# Patient Record
Sex: MALE | Race: WHITE | Employment: OTHER | URBAN - METROPOLITAN AREA
[De-identification: names, ages, dates, MRNs, and addresses within clinical notes are randomized per-mention and may not be internally consistent; named-entity substitution may affect disease eponyms.]

---

## 2020-02-26 ENCOUNTER — HOSPITAL ENCOUNTER (OUTPATIENT)
Dept: WOUND CARE | Age: 70
Discharge: HOME | End: 2020-02-26
Payer: MEDICARE

## 2020-02-26 VITALS
HEIGHT: 73 IN | WEIGHT: 204 LBS | TEMPERATURE: 97.4 F | HEART RATE: 71 BPM | RESPIRATION RATE: 18 BRPM | DIASTOLIC BLOOD PRESSURE: 75 MMHG | SYSTOLIC BLOOD PRESSURE: 150 MMHG | BODY MASS INDEX: 27.04 KG/M2

## 2020-02-26 PROCEDURE — 99203 OFFICE O/P NEW LOW 30 MIN: CPT | Performed by: INTERNAL MEDICINE

## 2020-02-26 PROCEDURE — 99203 OFFICE O/P NEW LOW 30 MIN: CPT

## 2020-02-26 RX ORDER — ASPIRIN 81 MG/1
81 TABLET, CHEWABLE ORAL DAILY
COMMUNITY

## 2020-02-26 RX ORDER — LEVOTHYROXINE SODIUM 0.2 MG/1
200 TABLET ORAL DAILY
COMMUNITY

## 2020-02-26 RX ORDER — ASCORBIC ACID 500 MG
1000 TABLET ORAL DAILY
COMMUNITY

## 2020-02-26 RX ORDER — GLIPIZIDE 5 MG/1
5 TABLET ORAL DAILY
COMMUNITY

## 2020-02-26 RX ORDER — AMLODIPINE BESYLATE AND BENAZEPRIL HYDROCHLORIDE 10; 20 MG/1; MG/1
1 CAPSULE ORAL DAILY
COMMUNITY

## 2020-02-26 RX ORDER — PREGABALIN 150 MG/1
150 CAPSULE ORAL 4 TIMES DAILY
COMMUNITY

## 2020-02-26 RX ORDER — LIDOCAINE 40 MG/G
CREAM TOPICAL ONCE
Status: DISCONTINUED | OUTPATIENT
Start: 2020-02-26 | End: 2020-02-27 | Stop reason: HOSPADM

## 2020-02-26 RX ORDER — OMEPRAZOLE 20 MG/1
20 CAPSULE, DELAYED RELEASE ORAL EVERY OTHER DAY
COMMUNITY

## 2020-02-26 SDOH — HEALTH STABILITY: MENTAL HEALTH: HOW OFTEN DO YOU HAVE A DRINK CONTAINING ALCOHOL?: NEVER

## 2020-02-26 NOTE — PLAN OF CARE
Pt. Referred to [facility] by his podiatrist Dr Juan Jose Roberts for left lateral ankle wound. Wound stable, no debridement today. Pt. To cont. With antibiotics as prescribed & Dr Sirisha Elkins will f/u with Dr Juan Jose Roberts re: culture results. Will discontinue vinegar soaks. Patient was previously using Gentamicin cream per podiatry. Will have patient start using Triad with the Gentamicin, change once daily. Pt. Wearing off-loading boot from podiatry. F/u in [facility] in 1 week as ordered. Discharge instructions reviewed with patient & wife, all questions answered, copy given to patient. Dressings were applied to all wounds per M.D. Instructions at this visit.

## 2020-02-27 PROBLEM — R97.20 ELEVATED PSA, LESS THAN 10 NG/ML: Status: ACTIVE | Noted: 2020-02-27

## 2020-02-27 PROBLEM — J96.01 ACUTE RESPIRATORY FAILURE WITH HYPOXIA (HCC): Status: RESOLVED | Noted: 2018-02-20 | Resolved: 2020-02-27

## 2020-02-27 PROBLEM — J96.01 ACUTE RESPIRATORY FAILURE WITH HYPOXIA (HCC): Status: ACTIVE | Noted: 2018-02-20

## 2020-02-27 PROBLEM — A41.9 SEVERE SEPSIS WITHOUT SEPTIC SHOCK (HCC): Status: ACTIVE | Noted: 2019-07-08

## 2020-02-27 PROBLEM — E11.610 CHARCOT'S ARTHROPATHY, DIABETIC (HCC): Status: ACTIVE | Noted: 2019-08-22

## 2020-02-27 PROBLEM — L97.321 DIABETIC ULCER OF LEFT ANKLE ASSOCIATED WITH TYPE 2 DIABETES MELLITUS, LIMITED TO BREAKDOWN OF SKIN (HCC): Status: ACTIVE | Noted: 2020-02-27

## 2020-02-27 PROBLEM — R78.81 GRAM-POSITIVE BACTEREMIA: Status: ACTIVE | Noted: 2018-02-22

## 2020-02-27 PROBLEM — J18.9 COMMUNITY ACQUIRED PNEUMONIA: Status: RESOLVED | Noted: 2019-07-08 | Resolved: 2020-02-27

## 2020-02-27 PROBLEM — S82.853A CLOSED TRIMALLEOLAR FRACTURE: Status: ACTIVE | Noted: 2018-01-31

## 2020-02-27 PROBLEM — E11.622 DIABETIC ULCER OF LEFT ANKLE ASSOCIATED WITH TYPE 2 DIABETES MELLITUS, LIMITED TO BREAKDOWN OF SKIN (HCC): Status: ACTIVE | Noted: 2020-02-27

## 2020-02-27 PROBLEM — I83.892: Status: ACTIVE | Noted: 2018-06-07

## 2020-02-27 PROBLEM — J18.9 COMMUNITY ACQUIRED PNEUMONIA: Status: ACTIVE | Noted: 2019-07-08

## 2020-02-27 PROBLEM — M19.172 POST-TRAUMATIC OSTEOARTHRITIS OF LEFT ANKLE: Status: ACTIVE | Noted: 2019-10-03

## 2020-02-27 PROBLEM — R65.20 SEVERE SEPSIS WITHOUT SEPTIC SHOCK (HCC): Status: ACTIVE | Noted: 2019-07-08

## 2020-02-27 PROBLEM — A41.9 SEVERE SEPSIS WITHOUT SEPTIC SHOCK (HCC): Status: RESOLVED | Noted: 2019-07-08 | Resolved: 2020-02-27

## 2020-02-27 PROBLEM — N17.9 AKI (ACUTE KIDNEY INJURY) (HCC): Status: RESOLVED | Noted: 2018-02-20 | Resolved: 2020-02-27

## 2020-02-27 PROBLEM — M25.572 CHRONIC PAIN OF LEFT ANKLE: Status: ACTIVE | Noted: 2018-02-01

## 2020-02-27 PROBLEM — M81.0 AGE-RELATED OSTEOPOROSIS WITHOUT CURRENT PATHOLOGICAL FRACTURE: Status: ACTIVE | Noted: 2018-08-27

## 2020-02-27 PROBLEM — N17.9 AKI (ACUTE KIDNEY INJURY) (HCC): Status: ACTIVE | Noted: 2018-02-20

## 2020-02-27 PROBLEM — Z86.010 HISTORY OF COLONIC POLYPS: Status: ACTIVE | Noted: 2020-02-27

## 2020-02-27 PROBLEM — I10 ESSENTIAL HYPERTENSION: Status: ACTIVE | Noted: 2020-02-27

## 2020-02-27 PROBLEM — R65.20 SEVERE SEPSIS WITHOUT SEPTIC SHOCK (HCC): Status: RESOLVED | Noted: 2019-07-08 | Resolved: 2020-02-27

## 2020-02-27 PROBLEM — J10.1 INFLUENZA A: Status: ACTIVE | Noted: 2020-02-27

## 2020-02-27 PROBLEM — H90.3 ASYMMETRICAL SENSORINEURAL HEARING LOSS: Status: ACTIVE | Noted: 2017-05-17

## 2020-02-27 PROBLEM — J10.1 INFLUENZA A: Status: RESOLVED | Noted: 2020-02-27 | Resolved: 2020-02-27

## 2020-02-27 PROBLEM — R78.81 GRAM-POSITIVE BACTEREMIA: Status: RESOLVED | Noted: 2018-02-22 | Resolved: 2020-02-27

## 2020-02-27 PROBLEM — S82.853A CLOSED TRIMALLEOLAR FRACTURE: Status: RESOLVED | Noted: 2018-01-31 | Resolved: 2020-02-27

## 2020-02-27 PROBLEM — G89.29 CHRONIC PAIN OF LEFT ANKLE: Status: ACTIVE | Noted: 2018-02-01

## 2020-02-27 PROBLEM — Z86.010 HISTORY OF COLONIC POLYPS: Status: RESOLVED | Noted: 2020-02-27 | Resolved: 2020-02-27

## 2020-02-27 PROBLEM — L03.116 CELLULITIS OF LEFT ANKLE: Status: ACTIVE | Noted: 2020-02-27

## 2020-02-27 RX ORDER — SULFAMETHOXAZOLE AND TRIMETHOPRIM 800; 160 MG/1; MG/1
1 TABLET ORAL 2 TIMES DAILY
COMMUNITY
Start: 2020-02-24 | End: 2020-03-07 | Stop reason: ALTCHOICE

## 2020-02-27 NOTE — CONSULTS
[facility] (and ID) Consult Note    Chago Ocasio     : 1950    DATE OF VISIT:  2020    Subjective: Chago Ocasio is a 65 y.o. male who has a diabetic and  neuropathic ulcer located on the left, lateral ankle. Dr. Kelvin Fairbanks requested a consultation regarding this new diabetic ulcer, complicated by infection, and at fairly high risk of complications. Current complaint of pain in this ulcer? yes. Quality of pain: aching and burning  Timing: intermittent and stable  Severity: mild  Associated Signs/Symptoms: swelling, redness, drainage (light, cloudy) and numbness  Other significant symptoms or pertinent ulcer history: Mr. Catalina Ruelas has a long history of DM, neuropathy, a trimalleolar ankle fracture, along with Charcot changes, osteoarthritis and osteoporosis affecting the left foot and ankle. He has been in and out of a few different boots and braces over the years, and recently transitioned into a new left ankle brace. Unfortunately it sounds like a new (presumably friction) blister developed at the lateral ankle, and then that was followed by some redness and increased swelling. He saw Dr. Ankit Amaral earlier this week, who got some repeat Xrays, debrided and drained the abscess, prescribed some empiric oral Bactrim, and a local-care regimen of vinegar soaks, gentamicin cream BID, and a new (temporary) AFO boot cast.    No F/C/D, tolerating Bactrim ok (had some brief GI upset, now gone), no sore throat or mouth, no diarrhea, no new rash. His LLE swelling might be increased a bit, as he normally wears a compression stocking, but can't do that right now because of the need to have the dressing on his ankle. Compared to Monday, his wife things the ankle look a bit less red, stably swollen, but the ulcer looks a bit larger to her, if perhaps healthier and less inflamed.       Additional ulcer(s) noted? no.      Mr. Dorothy Litten has a past medical history of Acute respiratory failure with hypoxia ([facility] [address] JIMMY (acute kidney injury) ([address] Atopic dermatitis, Closed fracture of phalanx of foot, Closed fracture of right fibula, Closed trimalleolar fracture of left ankle, Colon polyps, Community acquired pneumonia, Cutaneous abscess of right foot, Influenza A, Lesion of left plantar nerve, Neuralgia and neuritis, Rectal bleeding, and Severe sepsis without septic shock ([address] He has a past surgical history that includes hernia repair; Cholecystectomy; shoulder surgery (Right, 2013); shoulder surgery (Left, 2016); Toe amputation (Left, 2007); Carpal tunnel release (Bilateral, 2013); Foot surgery (Right, 1991); Vasectomy; Elbow surgery (Right, 2004); Tonsillectomy; Colonoscopy; Thyroidectomy, Completion (2016); and Wrist fracture surgery (Left, 1998). His family history includes COPD in his father and mother; Diabetes in his mother. Mr. Vitor Rodriguez reports that he has never smoked. He has never used smokeless tobacco. He reports that he does not drink alcohol or use drugs. His current medication list consists of Calcium Citrate, Multiple Vitamin, NONFORMULARY, Vitamin D, amLODIPine-benazepril, aspirin, glipiZIDE, levothyroxine, metFORMIN, omeprazole, pregabalin, and vitamin C. Allergies: Adhesive tape    Pertinent items from the review of systems are discussed in the HPI; the remainder of the ROS was reviewed and is negative.      Objective:     Vitals:    02/26/20 0947   BP: (!) 150/75   Pulse: 71   Resp: 18   Temp: 97.4 °F (36.3 °C)   TempSrc: Oral   Weight: 204 lb (92.5 kg)   Height: 6' 1\" (1.854 m)       Constitutional:  well-developed, well-nourished, NAD  Psychiatric:  oriented to person, place and time; mood and affect appropriate for the situation   Eyes:  pupils equal, round and reactive to light; sclerae anicteric, conjunctivae not pale  ENT: no thrush or oral ulcers, mucous membranes moist  Abd: soft, NT, ND, good BS  Cardiovascular:  bilateral pedal pulses palpable, foot warm, good cap refill; mild right and moderate left lower extremity pitting edema  Lymphatic:  no inguinal or popliteal adenopathy, no angitis, presumably fading and receding cellulitis of the left ankle, no fluctuance  Musculoskeletal:  no clubbing, cyanosis or petechiae; RLE and LLE with no gross effusions or acute arthritis, presumably stable Charcot and post-op deformity of the left foot and ankle  Rita-ulcer skin: indurated, pink, erythematous , warm and one edge a bit macerated. Ulcer(s): central area that his wife says represents the size of the original blister is superficial, pale, pink, fibrin, serous exudate, but there's a small extension of the ulcer in the plantar direction, compared to Monday, that actually looks quite clean and red. Photos also saved in electronic chart. Today's Wound Measurements, per RN documentation:  Wound 02/26/20 #1, Left Ankle, Diabetic foot ulcer, Turcios 1, Onset 2/20/20-Wound Length (cm): 1.2 cm    Wound 02/26/20 #1, Left Ankle, Diabetic foot ulcer, Turcios 1, Onset 2/20/20-Wound Width (cm): 2.5 cm    Wound 02/26/20 #1, Left Ankle, Diabetic foot ulcer, Turcios 1, Onset 2/20/20-Wound Depth (cm): 0.1 cm  ______________________________    [facility] labs from January: Creat 0.95, albumin 4.4, Hgb 15.4, Hgb A1c 6.3%. Monday outpatient WCx pending. Monday XR without any signs of osteomyelitis. LLE arterial Duplex from May 2019 -- Ankle/brachial index in the left lower extremity is within normal limits, suggesting no evidence of arterial insufficiency at rest. Normal multiphasic doppler spectra at the left common femoral level suggest no evidence of significant inflow occlusive disease in the left lower extremity. There is no evidence of significant femoropopliteal occlusive disease or plaque formation in the left lower extremity. No significant tibial occlusive disease is noted and there is normal, three-vessel runoff in the left lower extremity.     Assessment: Patient Active Problem List   Diagnosis Code    Age-related osteoporosis without current pathological fracture M81.0    Asymmetrical sensorineural hearing loss H90.5    Charcot's arthropathy, diabetic (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 116    Elevated PSA, less than 10 ng/ml R97.20    Acquired cavus foot deformity M21.6X9    Claw toe, acquired M20.5X9       Assessment of today's active condition(s): well controlled DM2, neuropathy, Charcot deformity, chronic LLE swelling, no signs of ischemic disease, new superficial diabetic neuropathic ulcer seemingly related to friction from a new brace, complicated by soft tissue infection, but it sounds like things are starting to improve a bit already, from Monday's debridement & drainage, and antibiotics, apart from perhaps just a bit of excess moisture in the area. No strong suspicion of residual deep infection at this point. Factors contributing to occurrence and/or persistence of the chronic ulcer include edema, diabetes and shear force. Medical necessity of today's visit is shown by the above documentation. Sharp debridement is not indicated today, based upon the exam findings in the wound(s) above.      Discharge plan:     Treatment in the wound care center today, per RN documentation: Wound 02/26/20 #1, Left Ankle, Diabetic foot ulcer, Turcios 1, Onset 2/20/20-Dressing/Treatment: Triad hydro(gentamycin cream, mepilex border, spandigrip - E). Continue Bactrim for now. Await final Cx. I'll ask Dr. An Conde to fax that to us when available. Call if fever, rash, sore mouth, vomiting, diarrhea, etc.     Can stop vinegar soaks at this point. Will add a bit of Triad dressing to support autolytic debridement of that fibrinous necrosis; might need a bit of sharp debridement next week again. I reminded the patient of the importance of weight management and smoking cessation, if applicable; also encouraged ambulation as tolerated, additional lower extremity exercises as instructed in our education sheet, leg elevation when at rest, and compliance with any recommended dietary, diuretic and compression therapies. While needing the ulcer dressing, we just gave him a couple of lengths of Spandagrip compression for his edema, in lieu of his stockings. Given his complicated foot and ankle history, I'm going to leave offloading and ambulation decisions to [facility] Conde and [employer]. Home treatment: good handwashing before and after any dressing changes. Cleanse ulcer with saline or soap & water before dressing change. May use Vaseline (petrolatum), Aquaphor, Aveeno, CeraVe, Cetaphil, Eucerin, Lubriderm, etc for dry skin. Dressing type for home: if the wound is fairly dry or neutral, gentamicin + Triad + dry dressing, but if remains a bit too moist, Triad + silver alginate + dry dressing, once daily. Written discharge instructions given to patient. Follow up in 1 week. Electronically signed by Gely Wing MD on 2/27/2020 at 11:39 AM.  ________________________    ADDENDUM --     Culture result faxed from Dr. Neftali Keys office -- no WBCs, no organisms, no growth in 48 hours (aerobic). No changes needed from my standpoint.     Electronically signed by Gely Wing MD on 2/28/2020 at 8:03 AM

## 2020-03-04 ENCOUNTER — HOSPITAL ENCOUNTER (OUTPATIENT)
Dept: WOUND CARE | Age: 70
Discharge: HOME | End: 2020-03-04
Payer: MEDICARE

## 2020-03-04 VITALS
DIASTOLIC BLOOD PRESSURE: 70 MMHG | HEART RATE: 66 BPM | BODY MASS INDEX: 27.3 KG/M2 | SYSTOLIC BLOOD PRESSURE: 146 MMHG | WEIGHT: 206 LBS | HEIGHT: 73 IN | TEMPERATURE: 97.1 F | RESPIRATION RATE: 18 BRPM

## 2020-03-04 PROCEDURE — 97597 DBRDMT OPN WND 1ST 20 CM/<: CPT | Performed by: INTERNAL MEDICINE

## 2020-03-04 PROCEDURE — 97597 DBRDMT OPN WND 1ST 20 CM/<: CPT

## 2020-03-04 RX ORDER — LIDOCAINE HYDROCHLORIDE 40 MG/ML
SOLUTION TOPICAL ONCE
Status: DISCONTINUED | OUTPATIENT
Start: 2020-03-04 | End: 2020-03-05 | Stop reason: HOSPADM

## 2020-03-04 NOTE — PLAN OF CARE
Wound stable, debridement per MD & pt. Tolerated well. Will cont. With current wound care regime. Pt. To cont. To wear the off-loading boot he got from Dr Too Viveros as ordered. [facility] as ordered. Discharge instructions reviewed with patient & wife, all questions answered, copy given to patient. Dressings were applied to all wounds per M.D. Instructions at this visit.

## 2020-03-07 PROBLEM — L03.116 CELLULITIS OF LEFT ANKLE: Status: RESOLVED | Noted: 2020-02-27 | Resolved: 2020-03-07

## 2020-03-07 NOTE — PROGRESS NOTES
[facility] Progress Note    Oxana Morrell     : 1950    DATE OF VISIT:  3/4/2020    Subjective: Oxana Morrell is a 65 y.o. male who has a diabetic ulcer located on the left, lateral ankle. Significant symptoms or pertinent wound history since last visit: feeling ok overall, mild discomfort, still some swelling, less drainage, no redness, no F/C/D. Completed Bactrim, no sore throat or mouth, no rash, no N/V/D. Additional ulcer(s) noted? no.      His current medication list consists of Calcium Citrate, Multiple Vitamin, NONFORMULARY, Vitamin D, amLODIPine-benazepril, aspirin, glipiZIDE, levothyroxine, metFORMIN, omeprazole, pregabalin, and vitamin C. Allergies: Adhesive tape    Objective:     Vitals:    20 0942   BP: (!) 146/70   Pulse: 66   Resp: 18   Temp: 97.1 °F (36.2 °C)   TempSrc: Oral   Weight: 206 lb (93.4 kg)   Height: 6' 1\" (1.854 m)     AAOx3, NAD  Intact distal pulses, foot warm, good cap refill  Mild-mod LLE edema  No cellulitis, angitis, fluctuance  No icterus, thrush, drug rash, abd tenderness  Rita-ulcer skin: healthy, warm and pink - less erythema, less maceration  Ulcer(s): partial thickness, a bit smaller, starting to look more pink and less fibrotic, some fibrinous debris and serous exudate. Photos also saved in electronic chart.     Today's wound measurements, per RN documentation:  Wound 20 #1, Left Ankle, Diabetic foot ulcer, Turcios 1, Onset 20-Wound Length (cm): 0.9 cm    Wound 20 #1, Left Ankle, Diabetic foot ulcer, Turcios 1, Onset 20-Wound Width (cm): 2.9 cm    Wound 20 #1, Left Ankle, Diabetic foot ulcer, Turcios 1, Onset 20-Wound Depth (cm): 0.1 cm    Assessment:     Patient Active Problem List   Diagnosis Code    Age-related osteoporosis without current pathological fracture M81.0    Asymmetrical sensorineural hearing loss H90.5    Charcot's arthropathy, diabetic ([address] E11.610    Chronic pain of left ankle M25.572, G89.29    Erectile dysfunction N52.9    Essential hypertension I10    GERD (gastroesophageal reflux disease) K21.9    Irritable bowel syndrome with constipation and diarrhea K58.2    Type 2 diabetes mellitus with diabetic polyneuropathy, without long-term current use of insulin ([facility]) E11.42    Postoperative hypothyroidism E89.0    Post-traumatic osteoarthritis of left ankle M19.172    Pure hypercholesterolemia E78.00    Varicose veins of lower extremity with edema, left I83.892    Diabetic ulcer of left ankle associated with type 2 diabetes mellitus, limited to breakdown of skin ([facility]) E11.622, L97.321    Elevated PSA, less than 10 ng/ml R97.20    Acquired cavus foot deformity M21.6X9    Claw toe, acquired M20.5X9       Assessment of today's active condition(s): DM2, Hx Charcot reconstruction, chronic venous edema, recent change in offloading boot, development of blister, diabetic ulcer, infection (now resolved), no signs of ischemia. Factors contributing to occurrence and/or persistence of the chronic ulcer include edema, diabetes and shear force. Medical necessity of today's visit is shown by the above documentation. Sharp debridement is indicated today, based upon the exam findings in the wound(s) above. Procedure note:     Consent obtained. Time out performed per [facility] policy. Anesthetic  Anesthetic: 4% Lidocaine Cream     Using a curette, I sharply debrided the left ankle ulcer(s) down through and including the removal of dermis. The type(s) of tissue debrided included fibrin and exudate. Total Surface Area Debrided: 2 sq cm. The ulcers were then irrigated with normal saline solution. The procedure was completed with a small amount of bleeding, and hemostasis was with pressure. The patient tolerated the procedure well, with no significant complications. The patient's level of pain during and after the procedure was monitored.  Post-debridement measurements, if different from pre-debridement, are in the flowsheet as well. Discharge plan:     Treatment in the wound care center today, per RN documentation: Wound 02/26/20 #1, Left Ankle, Diabetic foot ulcer, Turcios 1, Onset 2/20/20-Dressing/Treatment: (triad gentamycin dry dressing spandagrip). I reminded the patient of the importance of weight management and smoking cessation, if applicable; also encouraged ambulation as tolerated, additional lower extremity exercises as instructed in our education sheet, leg elevation when at rest, and compliance with any recommended dietary, diuretic and compression therapies. Continue good work with glucoses; no additional Abx needed. AFO boot-cast per Dr. Daphney Wyatt. Home treatment: good handwashing before and after any dressing changes. Cleanse wound with saline or soap & water before dressing change. May use Vaseline (petrolatum), Aquaphor, Aveeno, CeraVe, Cetaphil, Eucerin, Lubriderm, etc for dry skin. Dressing type for home: as above, once daily. Written discharge instructions given to patient. Follow up in 1 week.     Electronically signed by Penny Downey MD on 3/7/2020 at 9:02 AM.

## 2020-03-11 ENCOUNTER — HOSPITAL ENCOUNTER (OUTPATIENT)
Dept: WOUND CARE | Age: 70
Discharge: HOME | End: 2020-03-11
Payer: MEDICARE

## 2020-03-11 VITALS
HEART RATE: 74 BPM | SYSTOLIC BLOOD PRESSURE: 157 MMHG | HEIGHT: 73 IN | RESPIRATION RATE: 20 BRPM | TEMPERATURE: 97.1 F | DIASTOLIC BLOOD PRESSURE: 70 MMHG | WEIGHT: 200 LBS | BODY MASS INDEX: 26.51 KG/M2

## 2020-03-11 PROCEDURE — 97597 DBRDMT OPN WND 1ST 20 CM/<: CPT

## 2020-03-11 PROCEDURE — 97597 DBRDMT OPN WND 1ST 20 CM/<: CPT | Performed by: INTERNAL MEDICINE

## 2020-03-11 RX ORDER — LIDOCAINE 40 MG/G
CREAM TOPICAL ONCE
Status: DISCONTINUED | OUTPATIENT
Start: 2020-03-11 | End: 2020-03-12 | Stop reason: HOSPADM

## 2020-03-15 NOTE — PROGRESS NOTES
[facility] Progress Note    Nirav Muir     : 1950    DATE OF VISIT:  3/11/2020    Subjective: Nirav Muir is a 65 y.o. male who has a diabetic ulcer located on the left, lateral ankle. Significant symptoms or pertinent wound history since last visit: feeling well, modest drainage, little discomfort, no pruritus, still some swelling, no F/C/D. A bit concerned about a rash around his medial foot, and then a small plantar midfoot ecchymosis was first noticed today - he doesn't recall stepping on anything, states that he is wearing his cast-boot almost all the time, including indoors. Additional ulcer(s) noted? no.      His current medication list consists of Calcium Citrate, Multiple Vitamin, NONFORMULARY, Vitamin D, amLODIPine-benazepril, aspirin, glipiZIDE, levothyroxine, metFORMIN, omeprazole, pregabalin, and vitamin C. Allergies: Adhesive tape    Objective:     Vitals:    20 0958   BP: (!) 157/70   Pulse: 74   Resp: 20   Temp: 97.1 °F (36.2 °C)   TempSrc: Oral   Weight: 200 lb (90.7 kg)   Height: 6' 1\" (1.854 m)     AAOx3, NAD  Intact distal pulses, foot warm, good cap refill  Mild-mod LLE edema  No cellulitis, angitis, fluctuance  Mild petechial rash on medial aspect of foot, I think from a combination of venous HTN, ASA therapy, and recent increase in edema / decrease in compression because he's focusing on dressing changes for the ankle ulcer over compression stocking use  Plantar midfoot irregular ecchymosis, perhaps 2-3 cm across, no signs of infection or deeper trauma  Rita-ulcer skin: pink, warm and hyperkeratotic. Ulcer(s): smaller, pink-red, finely granular, fibrin, serous exudate. Photos also saved in electronic chart.     Today's wound measurements, per RN documentation:  Wound 20 #1, Left Ankle, Diabetic foot ulcer, Turcios 1, Onset 20-Wound Length (cm): 0.8 cm    Wound 20 #1, Left Ankle, Diabetic foot ulcer, Turcios 1, Onset 2/20/20-Wound Width (cm): 1 cm    Wound 02/26/20 #1, Left Ankle, Diabetic foot ulcer, Turcios 1, Onset 2/20/20-Wound Depth (cm): 0.1 cm    Assessment:     Patient Active Problem List   Diagnosis Code    Age-related osteoporosis without current pathological fracture M81.0    Asymmetrical sensorineural hearing loss H90.5    Charcot's arthropathy, diabetic ([facility] E11.610    Chronic pain of left ankle M25.572, G89.29    Erectile dysfunction N52.9    Essential hypertension I10    GERD (gastroesophageal reflux disease) K21.9    Irritable bowel syndrome with constipation and diarrhea K58.2    Type 2 diabetes mellitus with diabetic polyneuropathy, without long-term current use of insulin ([facility]) E11.42    Postoperative hypothyroidism E89.0    Post-traumatic osteoarthritis of left ankle M19.172    Pure hypercholesterolemia E78.00    Varicose veins of lower extremity with edema, left I83.892    Diabetic ulcer of left ankle associated with type 2 diabetes mellitus, limited to breakdown of skin ([facility]) E11.622, L97.321    Elevated PSA, less than 10 ng/ml R97.20    Acquired cavus foot deformity M21.6X9    Claw toe, acquired M20.5X9       Assessment of today's active condition(s): DM2, neuropathy, Hx Charcot foot, new blister and ulcer at left lateral ankle after getting into a new brace; recent infection resolved, no signs of ischemia, ulcer healing fairly well. Factors contributing to occurrence and/or persistence of the chronic ulcer include edema, diabetes and shear force. Medical necessity of today's visit is shown by the above documentation. Sharp debridement is indicated today, based upon the exam findings in the wound(s) above. Procedure note:     Consent obtained. Time out performed per [facility] policy. Anesthetic  Anesthetic: 4% Lidocaine Cream     Using a #15 blade scalpel, I sharply debrided the left ankle ulcer(s) down through and including the removal of dermis.  The type(s) of tissue debrided included fibrin and exudate. Total Surface Area Debrided: 1 sq cm. The ulcers were then irrigated with normal saline solution. The procedure was completed with a small amount of bleeding, and hemostasis was with pressure. The patient tolerated the procedure well, with no significant complications. The patient's level of pain during and after the procedure was monitored. Post-debridement measurements, if different from pre-debridement, are in the flowsheet as well. Discharge plan:     Treatment in the wound care center today, per RN documentation: Wound 02/26/20 #1, Left Ankle, Diabetic foot ulcer, Turcios 1, Onset 2/20/20-Dressing/Treatment: (gentamycin purachol ag dry dressing spandagrip). Continue cast-boot for offloading for now. Be sure not to walk without shoe or boot, even indoors, given neuropathy and risk of injury. I reminded the patient of the importance of weight management and smoking cessation, if applicable; also encouraged ambulation as tolerated, additional lower extremity exercises as instructed in our education sheet, leg elevation when at rest, and compliance with any recommended dietary, diuretic and compression therapies. Tubular compression bandage until he can resume his stocking, after the ankle ulcer is healed. Home treatment: good handwashing before and after any dressing changes. Cleanse wound with saline or soap & water before dressing change. May use Vaseline (petrolatum), Aquaphor, Aveeno, CeraVe, Cetaphil, Eucerin, Lubriderm, etc for dry skin. Dressing type for home: as above, every day or two, as needed. Written discharge instructions given to patient. Follow up in 1 week.     Electronically signed by Carolynne Severin, MD on 3/15/2020 at 6:54 PM.

## 2020-03-18 ENCOUNTER — HOSPITAL ENCOUNTER (OUTPATIENT)
Dept: WOUND CARE | Age: 70
Discharge: HOME | End: 2020-03-18
Payer: MEDICARE

## 2020-03-18 VITALS
TEMPERATURE: 97.3 F | HEART RATE: 66 BPM | DIASTOLIC BLOOD PRESSURE: 73 MMHG | SYSTOLIC BLOOD PRESSURE: 143 MMHG | WEIGHT: 203.38 LBS | RESPIRATION RATE: 18 BRPM | BODY MASS INDEX: 26.95 KG/M2 | HEIGHT: 73 IN

## 2020-03-18 PROCEDURE — 97597 DBRDMT OPN WND 1ST 20 CM/<: CPT | Performed by: INTERNAL MEDICINE

## 2020-03-18 PROCEDURE — 97597 DBRDMT OPN WND 1ST 20 CM/<: CPT

## 2020-03-18 RX ORDER — LIDOCAINE HYDROCHLORIDE 40 MG/ML
SOLUTION TOPICAL ONCE
Status: DISCONTINUED | OUTPATIENT
Start: 2020-03-18 | End: 2020-03-19 | Stop reason: HOSPADM

## 2020-03-18 ASSESSMENT — PAIN SCALES - GENERAL: PAINLEVEL_OUTOF10: 0

## 2020-03-18 NOTE — PLAN OF CARE
Wound improving well, debridement per MD & pt. Tolerated well. Will cont. With current wound care regime. F/u in [facility] in 2 weeks as ordered. Discharge instructions reviewed with patient, all questions answered, copy given to patient. Dressings were applied to all wounds per M.D. Instructions at this visit.

## 2020-03-18 NOTE — PROGRESS NOTES
Ankle, Diabetic foot ulcer, Turcios 1, Onset 2/20/20-Wound Width (cm): 0.4 cm    Wound 02/26/20 #1, Left Ankle, Diabetic foot ulcer, Turcios 1, Onset 2/20/20-Wound Depth (cm): 0.1 cm    Assessment:     Patient Active Problem List   Diagnosis Code    Age-related osteoporosis without current pathological fracture M81.0    Asymmetrical sensorineural hearing loss H90.5    Charcot's arthropathy, diabetic ([facility] [address] E11.610    Chronic pain of left ankle M25.572, G89.29    Erectile dysfunction N52.9    Essential hypertension I10    GERD (gastroesophageal reflux disease) K21.9    Irritable bowel syndrome with constipation and diarrhea K58.2    Type 2 diabetes mellitus with diabetic polyneuropathy, without long-term current use of insulin ([facility]) E11.42    Postoperative hypothyroidism E89.0    Post-traumatic osteoarthritis of left ankle M19.172    Pure hypercholesterolemia E78.00    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 Factors contributing to occurrence and/or persistence of the chronic ulcer include edema, diabetes and shear force. Medical necessity of today's visit is shown by the above documentation. Sharp debridement is indicated today, based upon the exam findings in the wound(s) above. Procedure note:     Consent obtained. Time out performed per [facility] policy.     Anesthetic  Anesthetic: 4% Lidocaine Cream     Using a curette, I sharply debrided the left ankle ulcer(s) down through and including the removal of dermis. The type(s) of tissue debrided included fibrin and exudate. Total Surface Area Debrided: 1 sq cm. The ulcers were then irrigated with normal saline solution. The procedure was completed with a small amount of bleeding, and hemostasis was with pressure. The patient tolerated the procedure well, with no significant complications. The patient's level of pain during and after the procedure was monitored. Post-debridement measurements, if different from pre-debridement, are in the flowsheet as well. Discharge plan:     Treatment in the wound care center today, per RN documentation: Wound 02/26/20 #1, Left Ankle, Diabetic foot ulcer, Turcios 1, Onset 2/20/20-Dressing/Treatment: (Gentamicin,Puracol Ag,gauze,Fredy,sgl Low Spandagrip). We discussed the risks and benefits of ongoing weekly follow-up versus taking a week off, given the coronavirus outbreak, and decided that a 2-week follow-up made the most sense. If he is clearly healed by then, he may call to cancel, and just resume long-term care with podiatry and orthopedics. If he has concerns before that 2-week paul, he can certainly call, and I'll see him back if needed. I reminded the patient of the importance of weight management and smoking cessation, if applicable; also encouraged ambulation as tolerated, additional lower extremity exercises as instructed in our education sheet, leg elevation when at rest, and compliance with any recommended dietary, diuretic and compression therapies. Continue current AFO cast boot until healed. Home treatment: good handwashing before and after any dressing changes. Cleanse wound with saline or soap & water before dressing change. May use Vaseline (petrolatum), Aquaphor, Aveeno, CeraVe, Cetaphil, Eucerin, Lubriderm, etc for dry skin. Dressing type for home: as above, once daily. Written discharge instructions given to patient. Follow up in 2 weeks.     Electronically signed by Rey Bernheim, MD on 3/18/2020 at [address] PM.

## 2020-04-01 ENCOUNTER — HOSPITAL ENCOUNTER (OUTPATIENT)
Dept: WOUND CARE | Age: 70
Discharge: HOME | End: 2020-04-01
Payer: MEDICARE

## 2020-04-08 ENCOUNTER — TELEPHONE (OUTPATIENT)
Dept: WOUND CARE | Age: 70
End: 2020-04-08

## 2020-04-13 ENCOUNTER — TELEPHONE (OUTPATIENT)
Dept: WOUND CARE | Age: 70
End: 2020-04-13

## 2021-07-20 ENCOUNTER — ANESTHESIA EVENT (OUTPATIENT)
Dept: OPERATING ROOM | Age: 71
End: 2021-07-20
Payer: MEDICARE

## 2021-07-20 RX ORDER — TAMSULOSIN HYDROCHLORIDE 0.4 MG/1
0.4 CAPSULE ORAL 2 TIMES DAILY
COMMUNITY

## 2021-07-20 NOTE — PROGRESS NOTES

## 2021-07-22 ENCOUNTER — HOSPITAL ENCOUNTER (OUTPATIENT)
Age: 71
Setting detail: OUTPATIENT SURGERY
Discharge: HOME | End: 2021-07-22
Attending: ORTHOPAEDIC SURGERY | Admitting: ORTHOPAEDIC SURGERY
Payer: MEDICARE

## 2021-07-22 ENCOUNTER — ANESTHESIA (OUTPATIENT)
Dept: OPERATING ROOM | Age: 71
End: 2021-07-22
Payer: MEDICARE

## 2021-07-22 ENCOUNTER — APPOINTMENT (OUTPATIENT)
Dept: GENERAL RADIOLOGY | Age: 71
End: 2021-07-22
Attending: ORTHOPAEDIC SURGERY
Payer: MEDICARE

## 2021-07-22 VITALS — SYSTOLIC BLOOD PRESSURE: 160 MMHG | TEMPERATURE: 75.4 F | DIASTOLIC BLOOD PRESSURE: 98 MMHG | OXYGEN SATURATION: 94 %

## 2021-07-22 VITALS
OXYGEN SATURATION: 93 % | BODY MASS INDEX: 26.24 KG/M2 | HEIGHT: 73 IN | RESPIRATION RATE: 12 BRPM | DIASTOLIC BLOOD PRESSURE: 73 MMHG | HEART RATE: 77 BPM | SYSTOLIC BLOOD PRESSURE: 135 MMHG | TEMPERATURE: 97.4 F | WEIGHT: 198 LBS

## 2021-07-22 DIAGNOSIS — S52.022A CLOSED FRACTURE OF OLECRANON PROCESS OF LEFT ULNA, INITIAL ENCOUNTER: Primary | ICD-10-CM

## 2021-07-22 LAB
ANION GAP SERPL CALCULATED.3IONS-SCNC: 12 MMOL/L (ref 3–16)
BUN BLDV-MCNC: 13 MG/DL (ref 7–20)
CALCIUM SERPL-MCNC: 8.8 MG/DL (ref 8.3–10.6)
CHLORIDE BLD-SCNC: 100 MMOL/L (ref 99–110)
CO2: 25 MMOL/L (ref 21–32)
CREAT SERPL-MCNC: 0.9 MG/DL (ref 0.8–1.3)
GFR AFRICAN AMERICAN: >60
GFR NON-AFRICAN AMERICAN: >60
GLUCOSE BLD-MCNC: 121 MG/DL (ref 70–99)
GLUCOSE BLD-MCNC: 139 MG/DL (ref 70–99)
HCT VFR BLD CALC: 41 % (ref 40.5–52.5)
HEMOGLOBIN: 13.8 G/DL (ref 13.5–17.5)
MCH RBC QN AUTO: 29.4 PG (ref 26–34)
MCHC RBC AUTO-ENTMCNC: 33.7 G/DL (ref 31–36)
MCV RBC AUTO: 87.3 FL (ref 80–100)
PDW BLD-RTO: 13.9 % (ref 12.4–15.4)
PERFORMED ON: ABNORMAL
PLATELET # BLD: 225 K/UL (ref 135–450)
PMV BLD AUTO: 8.6 FL (ref 5–10.5)
POTASSIUM SERPL-SCNC: 4.5 MMOL/L (ref 3.5–5.1)
RBC # BLD: 4.69 M/UL (ref 4.2–5.9)
SODIUM BLD-SCNC: 137 MMOL/L (ref 136–145)
WBC # BLD: 8.3 K/UL (ref 4–11)

## 2021-07-22 PROCEDURE — 80048 BASIC METABOLIC PNL TOTAL CA: CPT

## 2021-07-22 PROCEDURE — 2720000010 HC SURG SUPPLY STERILE: Performed by: ORTHOPAEDIC SURGERY

## 2021-07-22 PROCEDURE — 7100000001 HC PACU RECOVERY - ADDTL 15 MIN: Performed by: ORTHOPAEDIC SURGERY

## 2021-07-22 PROCEDURE — 2500000003 HC RX 250 WO HCPCS: Performed by: ANESTHESIOLOGY

## 2021-07-22 PROCEDURE — 6360000002 HC RX W HCPCS: Performed by: ANESTHESIOLOGY

## 2021-07-22 PROCEDURE — 6360000002 HC RX W HCPCS: Performed by: NURSE ANESTHETIST, CERTIFIED REGISTERED

## 2021-07-22 PROCEDURE — 2500000003 HC RX 250 WO HCPCS: Performed by: NURSE ANESTHETIST, CERTIFIED REGISTERED

## 2021-07-22 PROCEDURE — 6360000002 HC RX W HCPCS: Performed by: ORTHOPAEDIC SURGERY

## 2021-07-22 PROCEDURE — 2709999900 HC NON-CHARGEABLE SUPPLY: Performed by: ORTHOPAEDIC SURGERY

## 2021-07-22 PROCEDURE — C9290 INJ, BUPIVACAINE LIPOSOME: HCPCS | Performed by: ORTHOPAEDIC SURGERY

## 2021-07-22 PROCEDURE — C1713 ANCHOR/SCREW BN/BN,TIS/BN: HCPCS | Performed by: ORTHOPAEDIC SURGERY

## 2021-07-22 PROCEDURE — 3700000001 HC ADD 15 MINUTES (ANESTHESIA): Performed by: ORTHOPAEDIC SURGERY

## 2021-07-22 PROCEDURE — 2580000003 HC RX 258: Performed by: NURSE ANESTHETIST, CERTIFIED REGISTERED

## 2021-07-22 PROCEDURE — 7100000011 HC PHASE II RECOVERY - ADDTL 15 MIN: Performed by: ORTHOPAEDIC SURGERY

## 2021-07-22 PROCEDURE — 2580000003 HC RX 258: Performed by: ORTHOPAEDIC SURGERY

## 2021-07-22 PROCEDURE — 6370000000 HC RX 637 (ALT 250 FOR IP): Performed by: ANESTHESIOLOGY

## 2021-07-22 PROCEDURE — 85027 COMPLETE CBC AUTOMATED: CPT

## 2021-07-22 PROCEDURE — 7100000000 HC PACU RECOVERY - FIRST 15 MIN: Performed by: ORTHOPAEDIC SURGERY

## 2021-07-22 PROCEDURE — 7100000010 HC PHASE II RECOVERY - FIRST 15 MIN: Performed by: ORTHOPAEDIC SURGERY

## 2021-07-22 PROCEDURE — 2580000003 HC RX 258: Performed by: ANESTHESIOLOGY

## 2021-07-22 PROCEDURE — 3600000014 HC SURGERY LEVEL 4 ADDTL 15MIN: Performed by: ORTHOPAEDIC SURGERY

## 2021-07-22 PROCEDURE — 2500000003 HC RX 250 WO HCPCS: Performed by: ORTHOPAEDIC SURGERY

## 2021-07-22 PROCEDURE — 3700000000 HC ANESTHESIA ATTENDED CARE: Performed by: ORTHOPAEDIC SURGERY

## 2021-07-22 PROCEDURE — 3600000004 HC SURGERY LEVEL 4 BASE: Performed by: ORTHOPAEDIC SURGERY

## 2021-07-22 PROCEDURE — 74300 X-RAY BILE DUCTS/PANCREAS: CPT

## 2021-07-22 PROCEDURE — 36415 COLL VENOUS BLD VENIPUNCTURE: CPT

## 2021-07-22 PROCEDURE — 3209999900 FLUORO FOR SURGICAL PROCEDURES

## 2021-07-22 DEVICE — SCREW BNE L24MM DIA3.5MM CORT S STL ST NONCANNULATED LOK: Type: IMPLANTABLE DEVICE | Site: ELBOW | Status: FUNCTIONAL

## 2021-07-22 DEVICE — SCREW BNE L26MM DIA2.7MM ANK S STL ST VAR ANG LOK FULL THRD: Type: IMPLANTABLE DEVICE | Site: ELBOW | Status: FUNCTIONAL

## 2021-07-22 DEVICE — SCREW BNE L20MM DIA3.5MM CORT S STL ST NONCANNULATED LOK: Type: IMPLANTABLE DEVICE | Site: ELBOW | Status: FUNCTIONAL

## 2021-07-22 DEVICE — SCREW BNE L56MM DIA2.7MM S STL ST VAR ANG LOK FULL THRD T8: Type: IMPLANTABLE DEVICE | Site: ELBOW | Status: FUNCTIONAL

## 2021-07-22 DEVICE — SCREW BNE L32MM DIA2.7MM S STL ST VAR ANG LOK FULL THRD T8: Type: IMPLANTABLE DEVICE | Site: ELBOW | Status: FUNCTIONAL

## 2021-07-22 DEVICE — SCREW BNE L50MM DIA2.7MM S STL ST VAR ANG LOK FULL THRD T8: Type: IMPLANTABLE DEVICE | Site: ELBOW | Status: FUNCTIONAL

## 2021-07-22 DEVICE — PLATE BNE L90MM 2 H ST L OLECRANON S STL LO PROF VAR ANG: Type: IMPLANTABLE DEVICE | Site: ELBOW | Status: FUNCTIONAL

## 2021-07-22 RX ORDER — SODIUM CHLORIDE 0.9 % (FLUSH) 0.9 %
10 SYRINGE (ML) INJECTION PRN
Status: DISCONTINUED | OUTPATIENT
Start: 2021-07-22 | End: 2021-07-22 | Stop reason: HOSPADM

## 2021-07-22 RX ORDER — MORPHINE SULFATE 10 MG/ML
2 INJECTION, SOLUTION INTRAMUSCULAR; INTRAVENOUS EVERY 5 MIN PRN
Status: DISCONTINUED | OUTPATIENT
Start: 2021-07-22 | End: 2021-07-22 | Stop reason: HOSPADM

## 2021-07-22 RX ORDER — SODIUM CHLORIDE 9 MG/ML
25 INJECTION, SOLUTION INTRAVENOUS PRN
Status: CANCELLED | OUTPATIENT
Start: 2021-07-22

## 2021-07-22 RX ORDER — MAGNESIUM HYDROXIDE 1200 MG/15ML
LIQUID ORAL CONTINUOUS PRN
Status: COMPLETED | OUTPATIENT
Start: 2021-07-22 | End: 2021-07-22

## 2021-07-22 RX ORDER — SODIUM CHLORIDE, SODIUM LACTATE, POTASSIUM CHLORIDE, CALCIUM CHLORIDE 600; 310; 30; 20 MG/100ML; MG/100ML; MG/100ML; MG/100ML
INJECTION, SOLUTION INTRAVENOUS CONTINUOUS
Status: DISCONTINUED | OUTPATIENT
Start: 2021-07-22 | End: 2021-07-22 | Stop reason: HOSPADM

## 2021-07-22 RX ORDER — ROCURONIUM BROMIDE 10 MG/ML
INJECTION, SOLUTION INTRAVENOUS PRN
Status: DISCONTINUED | OUTPATIENT
Start: 2021-07-22 | End: 2021-07-22 | Stop reason: SDUPTHER

## 2021-07-22 RX ORDER — LIDOCAINE HYDROCHLORIDE 20 MG/ML
INJECTION, SOLUTION INFILTRATION; PERINEURAL PRN
Status: DISCONTINUED | OUTPATIENT
Start: 2021-07-22 | End: 2021-07-22 | Stop reason: SDUPTHER

## 2021-07-22 RX ORDER — OXYCODONE HYDROCHLORIDE AND ACETAMINOPHEN 5; 325 MG/1; MG/1
1 TABLET ORAL PRN
Status: COMPLETED | OUTPATIENT
Start: 2021-07-22 | End: 2021-07-22

## 2021-07-22 RX ORDER — HYDROCODONE BITARTRATE AND ACETAMINOPHEN 5; 325 MG/1; MG/1
1 TABLET ORAL EVERY 6 HOURS PRN
Qty: 12 TABLET | Refills: 0 | Status: SHIPPED | OUTPATIENT
Start: 2021-07-22 | End: 2021-07-25

## 2021-07-22 RX ORDER — ONDANSETRON 2 MG/ML
INJECTION INTRAMUSCULAR; INTRAVENOUS PRN
Status: DISCONTINUED | OUTPATIENT
Start: 2021-07-22 | End: 2021-07-22 | Stop reason: SDUPTHER

## 2021-07-22 RX ORDER — SODIUM CHLORIDE 0.9 % (FLUSH) 0.9 %
5-40 SYRINGE (ML) INJECTION PRN
Status: CANCELLED | OUTPATIENT
Start: 2021-07-22

## 2021-07-22 RX ORDER — ONDANSETRON 2 MG/ML
4 INJECTION INTRAMUSCULAR; INTRAVENOUS
Status: DISCONTINUED | OUTPATIENT
Start: 2021-07-22 | End: 2021-07-22 | Stop reason: HOSPADM

## 2021-07-22 RX ORDER — PROPOFOL 10 MG/ML
INJECTION, EMULSION INTRAVENOUS PRN
Status: DISCONTINUED | OUTPATIENT
Start: 2021-07-22 | End: 2021-07-22 | Stop reason: SDUPTHER

## 2021-07-22 RX ORDER — DEXAMETHASONE SODIUM PHOSPHATE 4 MG/ML
INJECTION, SOLUTION INTRA-ARTICULAR; INTRALESIONAL; INTRAMUSCULAR; INTRAVENOUS; SOFT TISSUE PRN
Status: DISCONTINUED | OUTPATIENT
Start: 2021-07-22 | End: 2021-07-22 | Stop reason: SDUPTHER

## 2021-07-22 RX ORDER — ONDANSETRON 2 MG/ML
4 INJECTION INTRAMUSCULAR; INTRAVENOUS EVERY 6 HOURS PRN
Status: CANCELLED | OUTPATIENT
Start: 2021-07-22

## 2021-07-22 RX ORDER — FENTANYL CITRATE 50 UG/ML
INJECTION, SOLUTION INTRAMUSCULAR; INTRAVENOUS PRN
Status: DISCONTINUED | OUTPATIENT
Start: 2021-07-22 | End: 2021-07-22 | Stop reason: SDUPTHER

## 2021-07-22 RX ORDER — SODIUM CHLORIDE, SODIUM LACTATE, POTASSIUM CHLORIDE, CALCIUM CHLORIDE 600; 310; 30; 20 MG/100ML; MG/100ML; MG/100ML; MG/100ML
INJECTION, SOLUTION INTRAVENOUS CONTINUOUS PRN
Status: DISCONTINUED | OUTPATIENT
Start: 2021-07-22 | End: 2021-07-22 | Stop reason: SDUPTHER

## 2021-07-22 RX ORDER — SODIUM CHLORIDE 9 MG/ML
25 INJECTION, SOLUTION INTRAVENOUS PRN
Status: DISCONTINUED | OUTPATIENT
Start: 2021-07-22 | End: 2021-07-22 | Stop reason: HOSPADM

## 2021-07-22 RX ORDER — APREPITANT 40 MG/1
40 CAPSULE ORAL ONCE
Status: COMPLETED | OUTPATIENT
Start: 2021-07-22 | End: 2021-07-22

## 2021-07-22 RX ORDER — MEPERIDINE HYDROCHLORIDE 25 MG/ML
12.5 INJECTION INTRAMUSCULAR; INTRAVENOUS; SUBCUTANEOUS EVERY 5 MIN PRN
Status: DISCONTINUED | OUTPATIENT
Start: 2021-07-22 | End: 2021-07-22 | Stop reason: HOSPADM

## 2021-07-22 RX ORDER — OXYCODONE HYDROCHLORIDE AND ACETAMINOPHEN 5; 325 MG/1; MG/1
2 TABLET ORAL PRN
Status: COMPLETED | OUTPATIENT
Start: 2021-07-22 | End: 2021-07-22

## 2021-07-22 RX ORDER — MORPHINE SULFATE 10 MG/ML
1 INJECTION, SOLUTION INTRAMUSCULAR; INTRAVENOUS EVERY 5 MIN PRN
Status: DISCONTINUED | OUTPATIENT
Start: 2021-07-22 | End: 2021-07-22 | Stop reason: HOSPADM

## 2021-07-22 RX ORDER — SODIUM CHLORIDE 0.9 % (FLUSH) 0.9 %
10 SYRINGE (ML) INJECTION EVERY 12 HOURS SCHEDULED
Status: DISCONTINUED | OUTPATIENT
Start: 2021-07-22 | End: 2021-07-22 | Stop reason: HOSPADM

## 2021-07-22 RX ORDER — KETOROLAC TROMETHAMINE 30 MG/ML
30 INJECTION, SOLUTION INTRAMUSCULAR; INTRAVENOUS ONCE
Status: COMPLETED | OUTPATIENT
Start: 2021-07-22 | End: 2021-07-22

## 2021-07-22 RX ORDER — ONDANSETRON 4 MG/1
4 TABLET, ORALLY DISINTEGRATING ORAL EVERY 8 HOURS PRN
Status: CANCELLED | OUTPATIENT
Start: 2021-07-22

## 2021-07-22 RX ORDER — SODIUM CHLORIDE 0.9 % (FLUSH) 0.9 %
5-40 SYRINGE (ML) INJECTION EVERY 12 HOURS SCHEDULED
Status: CANCELLED | OUTPATIENT
Start: 2021-07-22

## 2021-07-22 RX ADMIN — FENTANYL CITRATE 100 MCG: 50 INJECTION INTRAMUSCULAR; INTRAVENOUS at 12:52

## 2021-07-22 RX ADMIN — FAMOTIDINE 20 MG: 10 INJECTION, SOLUTION INTRAVENOUS at 11:57

## 2021-07-22 RX ADMIN — DEXAMETHASONE SODIUM PHOSPHATE 4 MG: 4 INJECTION, SOLUTION INTRAMUSCULAR; INTRAVENOUS at 12:52

## 2021-07-22 RX ADMIN — APREPITANT 40 MG: 40 CAPSULE ORAL at 12:00

## 2021-07-22 RX ADMIN — PROPOFOL 200 MG: 10 INJECTION, EMULSION INTRAVENOUS at 12:52

## 2021-07-22 RX ADMIN — KETOROLAC TROMETHAMINE 30 MG: 30 INJECTION, SOLUTION INTRAMUSCULAR; INTRAVENOUS at 15:31

## 2021-07-22 RX ADMIN — Medication 0.5 MG: at 15:13

## 2021-07-22 RX ADMIN — OXYCODONE HYDROCHLORIDE AND ACETAMINOPHEN 2 TABLET: 5; 325 TABLET ORAL at 15:24

## 2021-07-22 RX ADMIN — SODIUM CHLORIDE, POTASSIUM CHLORIDE, SODIUM LACTATE AND CALCIUM CHLORIDE: 600; 310; 30; 20 INJECTION, SOLUTION INTRAVENOUS at 11:51

## 2021-07-22 RX ADMIN — Medication 0.5 MG: at 15:20

## 2021-07-22 RX ADMIN — ROCURONIUM BROMIDE 50 MG: 10 INJECTION, SOLUTION INTRAVENOUS at 12:52

## 2021-07-22 RX ADMIN — SODIUM CHLORIDE, POTASSIUM CHLORIDE, SODIUM LACTATE AND CALCIUM CHLORIDE: 600; 310; 30; 20 INJECTION, SOLUTION INTRAVENOUS at 12:45

## 2021-07-22 RX ADMIN — LIDOCAINE HYDROCHLORIDE 100 MG: 20 INJECTION, SOLUTION INFILTRATION; PERINEURAL at 12:52

## 2021-07-22 RX ADMIN — ONDANSETRON 4 MG: 2 INJECTION, SOLUTION INTRAMUSCULAR; INTRAVENOUS at 12:52

## 2021-07-22 RX ADMIN — SUGAMMADEX 200 MG: 100 INJECTION, SOLUTION INTRAVENOUS at 14:40

## 2021-07-22 RX ADMIN — Medication 2000 MG: at 13:20

## 2021-07-22 ASSESSMENT — PAIN SCALES - GENERAL
PAINLEVEL_OUTOF10: 7
PAINLEVEL_OUTOF10: 4
PAINLEVEL_OUTOF10: 7
PAINLEVEL_OUTOF10: 7

## 2021-07-22 ASSESSMENT — PULMONARY FUNCTION TESTS
PIF_VALUE: 0
PIF_VALUE: 16
PIF_VALUE: 16
PIF_VALUE: 15
PIF_VALUE: 16
PIF_VALUE: 16
PIF_VALUE: 15
PIF_VALUE: 15
PIF_VALUE: 16
PIF_VALUE: 0
PIF_VALUE: 15
PIF_VALUE: 16
PIF_VALUE: 15
PIF_VALUE: 15
PIF_VALUE: 16
PIF_VALUE: 2
PIF_VALUE: 10
PIF_VALUE: 16
PIF_VALUE: 16
PIF_VALUE: 10
PIF_VALUE: 15
PIF_VALUE: 15
PIF_VALUE: 16
PIF_VALUE: 15
PIF_VALUE: 1
PIF_VALUE: 16
PIF_VALUE: 1
PIF_VALUE: 16
PIF_VALUE: 23
PIF_VALUE: 21
PIF_VALUE: 15
PIF_VALUE: 16
PIF_VALUE: 15
PIF_VALUE: 19
PIF_VALUE: 16
PIF_VALUE: 15
PIF_VALUE: 16
PIF_VALUE: 15
PIF_VALUE: 15
PIF_VALUE: 19
PIF_VALUE: 16
PIF_VALUE: 15
PIF_VALUE: 16
PIF_VALUE: 15
PIF_VALUE: 15
PIF_VALUE: 16
PIF_VALUE: 16
PIF_VALUE: 15
PIF_VALUE: 16
PIF_VALUE: 15
PIF_VALUE: 0
PIF_VALUE: 16
PIF_VALUE: 0
PIF_VALUE: 15
PIF_VALUE: 16
PIF_VALUE: 15
PIF_VALUE: 16
PIF_VALUE: 19
PIF_VALUE: 35
PIF_VALUE: 16
PIF_VALUE: 15
PIF_VALUE: 16
PIF_VALUE: 9
PIF_VALUE: 15
PIF_VALUE: 15
PIF_VALUE: 16
PIF_VALUE: 15
PIF_VALUE: 0
PIF_VALUE: 16
PIF_VALUE: 16
PIF_VALUE: 0
PIF_VALUE: 16
PIF_VALUE: 15
PIF_VALUE: 16
PIF_VALUE: 15
PIF_VALUE: 15
PIF_VALUE: 3
PIF_VALUE: 16
PIF_VALUE: 15
PIF_VALUE: 3
PIF_VALUE: 15
PIF_VALUE: 16
PIF_VALUE: 15
PIF_VALUE: 15
PIF_VALUE: 16
PIF_VALUE: 1
PIF_VALUE: 16
PIF_VALUE: 15
PIF_VALUE: 15
PIF_VALUE: 16
PIF_VALUE: 16
PIF_VALUE: 15
PIF_VALUE: 16

## 2021-07-22 ASSESSMENT — PAIN DESCRIPTION - PAIN TYPE
TYPE: SURGICAL PAIN

## 2021-07-22 ASSESSMENT — PAIN DESCRIPTION - ORIENTATION
ORIENTATION: LEFT

## 2021-07-22 ASSESSMENT — PAIN DESCRIPTION - LOCATION
LOCATION: ARM

## 2021-07-22 ASSESSMENT — ENCOUNTER SYMPTOMS: SHORTNESS OF BREATH: 0

## 2021-07-22 ASSESSMENT — PAIN - FUNCTIONAL ASSESSMENT: PAIN_FUNCTIONAL_ASSESSMENT: 0-10

## 2021-07-22 ASSESSMENT — LIFESTYLE VARIABLES: SMOKING_STATUS: 0

## 2021-07-22 ASSESSMENT — PAIN DESCRIPTION - DESCRIPTORS: DESCRIPTORS: ACHING;CONSTANT;DISCOMFORT

## 2021-07-22 NOTE — ANESTHESIA POSTPROCEDURE EVALUATION
Department of Anesthesiology  Postprocedure Note    Patient: Laverne Oneal  MRN: [id number]  YOB: 1950  Date of evaluation: 7/22/2021  Time:  4:10 PM     Procedure Summary     Date: 07/22/21 Room / Location: [facility]    Anesthesia Start: 9594 Anesthesia Stop: 1962    Procedure: LEFT ELBOW OLECRANON OPEN REDUCTION INTERNAL FIXATION (Left Elbow) Diagnosis: (LEFT ELBOW OLECRANON FRACTURE)    Surgeons: Toya Garrett Responsible Provider: Heidy Ozuna MD    Anesthesia Type: general ASA Status: 3          Anesthesia Type: general    Jigar Phase I: Jigar Score: 8    Jigar Phase II: Jigar Score: 10    Last vitals: Reviewed and per EMR flowsheets.      Vitals:    07/22/21 1515 07/22/21 1520 07/22/21 1530 07/22/21 1540   BP: (!) 142/74 138/66 138/66 135/73   Pulse: 84 80 81 77   Resp: 19 16 19 12   Temp:  97.2 °F (36.2 °C)  97.4 °F (36.3 °C)   TempSrc:  Temporal  Temporal   SpO2: 94%  94% 93%   Weight:       Height:         Anesthesia Post Evaluation    Patient location during evaluation: bedside  Patient participation: complete - patient participated  Level of consciousness: awake and alert  Airway patency: patent  Nausea & Vomiting: no nausea  Complications: no  Cardiovascular status: hemodynamically stable  Respiratory status: acceptable  Hydration status: euvolemic

## 2021-07-22 NOTE — OP NOTE
Operative Note      Patient: Najma Aguirre  YOB: 1950  MRN: [id number]    Date of Procedure: 7/22/2021    Pre-Op Diagnosis: LEFT ELBOW OLECRANON FRACTURE    Post-Op Diagnosis: Same       Procedure(s):  LEFT ELBOW OLECRANON OPEN REDUCTION INTERNAL FIXATION CPT 04943  Left Elbow arthrotomy with removal of loose bodies, CPT 92559    Surgeon(s):  Eliana Lechuga    Assistant:   Surgical Assistant: Celia Barone    Anesthesia: General    Estimated Blood Loss (mL): Minimal    Complications: None      Total Tourniquet Time Documented:  Arm  (Left) - 67 minutes  Total: Arm  (Left) - 67 minutes      Specimens:   * No specimens in log *    Implants: Synthes VA Olecranon plate and screws. Drains: * No LDAs found *    Findings: Comminuted proximal olecranon fracture with at least 2 pieces. This was reduced directly and with FiberWire and plate and screws. The ulnar trochlear joint was stable. Detailed Description of Procedure:   Patient identified the preoperative holding area and the operative site was marked. Patient was taken back to the operative suite general anesthesia was induced. He was placed in a lateral decubitus position with left arm up. The bony prominences were padded and axillary roll was used and beanbag was deflated. The appropriate preoperative surgical pauses were all undertaken everyone agreed on the side site and procedure. The typical aseptic technique was used for prepping and draping the left arm. Padded tourniquet had been applied to the upper arm prior to draping. The arm was elevated and exsanguinated with an Esmarch bandage and the tourniquet was inflated to 250 mmHg. An incision was made posteriorly along the olecranon curving slightly laterally. Sharp dissection was carried through the skin and subcutaneous tissues down to the fascia. The triceps was identified proximally. Form pressure was identified distally.   The fracture was identified it was fairly at 2:59 PM

## 2021-07-22 NOTE — ANESTHESIA PRE PROCEDURE
Department of Anesthesiology  Preprocedure Note       Name:  Gisela Lopez   Age:  66 y.o.  :  1950                                          MRN:  [id number]         Date:  2021      Surgeon: Felecia Rodriguez):  Mayra Smiley    Procedure: Procedure(s):  LEFT ELBOW OLECRANON OPEN REDUCTION INTERNAL FIXATION W/ REGIONAL BLOCK    Medications prior to admission:   Prior to Admission medications    Medication Sig Start Date End Date Taking? Authorizing Provider   tamsulosin (FLOMAX) 0.4 MG capsule Take 0.4 mg by mouth 2 times daily   Yes Historical Provider, MD   levothyroxine (SYNTHROID) 200 MCG tablet Take 200 mcg by mouth Daily   Yes Historical Provider, MD   metFORMIN (GLUCOPHAGE) 1000 MG tablet Take 1,000 mg by mouth 2 times daily (with meals)   Yes Historical Provider, MD   glipiZIDE (GLUCOTROL) 5 MG tablet Take 5 mg by mouth daily   Yes Historical Provider, MD   pregabalin (LYRICA) 150 MG capsule Take 150 mg by mouth 4 times daily.    Yes Historical Provider, MD   CALCIUM CITRATE PO Take 600 mg by mouth daily   Yes Historical Provider, MD   VITAMIN D PO Take 5,000 Units by mouth daily   Yes Historical Provider, MD   amLODIPine-benazepril (LOTREL) 10-20 MG per capsule Take 1 capsule by mouth daily   Yes Historical Provider, MD   aspirin 81 MG chewable tablet Take 81 mg by mouth daily   Yes Historical Provider, MD   Multiple Vitamin (ONE-A-DAY MENS PO) Take by mouth daily   Yes Historical Provider, MD   vitamin C (ASCORBIC ACID) 500 MG tablet Take 1,000 mg by mouth daily   Yes Historical Provider, MD   omeprazole (PRILOSEC) 20 MG delayed release capsule Take 20 mg by mouth every other day   Yes Historical Provider, MD   NONFORMULARY Take 2 tablets by mouth daily Neurx-TF     For peripheral neuropathy   Yes Historical Provider, MD       Current medications:    Current Facility-Administered Medications   Medication Dose Route Frequency Provider Last Rate Last Admin    vancomycin 1500 mg in dextrose 5% 300 Tests: No results for input(s): POCGLU, POCNA, POCK, POCCL, POCBUN, POCHEMO, POCHCT in the last 72 hours. Coags: No results found for: PROTIME, INR, APTT    HCG (If Applicable): No results found for: PREGTESTUR, PREGSERUM, HCG, HCGQUANT     ABGs: No results found for: PHART, PO2ART, GMU4NHM, FQA4KUQ, BEART, B8WHAUVH     Type & Screen (If Applicable):  No results found for: LABABO, LABRH    Drug/Infectious Status (If Applicable):  No results found for: HIV, HEPCAB    COVID-19 Screening (If Applicable): No results found for: COVID19        Anesthesia Evaluation  Patient summary reviewed   history of anesthetic complications: PONV. Airway: Mallampati: II  TM distance: >3 FB   Neck ROM: full  Mouth opening: > = 3 FB Dental:    (+) upper dentures      Pulmonary:Negative Pulmonary ROS breath sounds clear to auscultation      (-) COPD, asthma, shortness of breath, recent URI, sleep apnea and not a current smoker          Patient did not smoke on day of surgery. Cardiovascular:    (+) hypertension:,     (-) pacemaker, past MI, CAD, CABG/stent, dysrhythmias,  angina and  CHF    ECG reviewed  Rhythm: regular  Rate: normal           Beta Blocker:  Not on Beta Blocker         Neuro/Psych:   (+) neuromuscular disease (L ankle charcot, s/p orif / ble neuropathy, dm, / hand tremor):,    (-) seizures, TIA, CVA and psychiatric history           GI/Hepatic/Renal:   (+) GERD: well controlled,      (-) liver disease, no renal disease, bowel prep and no morbid obesity       Endo/Other:    (+) DiabetesType II DM, , hypothyroidism (s/p total thyroidectomy, benign): arthritis:., no malignancy/cancer. (-) hyperthyroidism, blood dyscrasia, no malignancy/cancer               Abdominal:       Abdomen: soft. Vascular: negative vascular ROS.          Other Findings: LUE SWELLING/BRUISING , N/V INTACT            Anesthesia Plan      general     ASA 3     (PT DECLINED PREOP BLK  ( MAY RECONSIDER POP, PRN))  Induction: intravenous. MIPS: Postoperative opioids intended and Prophylactic antiemetics administered. Anesthetic plan and risks discussed with patient. Plan discussed with CRNA. This pre-anesthesia assessment may be used as a history and physical.    DOS STAFF ADDENDUM:    Pt seen and examined, chart reviewed (including anesthesia, drug and allergy history). No interval changes to history and physical examination. Anesthetic plan, risks, benefits, alternatives, and personnel involved discussed with patient. Patient verbalized an understanding and agrees to proceed.       Heidy Ozuna MD  July 22, 2021  11:40 AM      Heidy Ozuna MD   7/22/2021

## (undated) DEVICE — DRAPE,REIN 53X77,STERILE: Brand: MEDLINE

## (undated) DEVICE — DRESSING,GAUZE,XEROFORM,CURAD,1"X8",ST: Brand: CURAD

## (undated) DEVICE — MAJOR SET UP PK

## (undated) DEVICE — BANDAGE COMPR W4INXL15FT BGE E SGL LAYERED CLP CLSR

## (undated) DEVICE — SUTURE FIBERWIRE SZ 2 W/ TAPERED NEEDLE BLUE L38IN NONABSORB BLU L26.5MM 1/2 CIRCLE AR7200

## (undated) DEVICE — GLOVE ORANGE PI 7 1/2   MSG9075

## (undated) DEVICE — GAUZE,SPONGE,4"X4",8PLY,STRL,LF,10/TRAY: Brand: MEDLINE

## (undated) DEVICE — 3M™ TEGADERM™ TRANSPARENT FILM DRESSING FRAME STYLE, 1626W, 4 IN X 4-3/4 IN (10 CM X 12 CM), 50/CT 4CT/CASE: Brand: 3M™ TEGADERM™

## (undated) DEVICE — BIT DRL L110MM DIA2.5MM G QUIK CPL W/O STP REUSE

## (undated) DEVICE — DRAPE C ARM UNIV W41XL74IN CLR PLAS XR VELC CLSR POLY STRP

## (undated) DEVICE — SUTURE FIBERWIRE SZ 2 L38IN NONABSORBABLE BLU L36.6MM 1/2 AR7202

## (undated) DEVICE — TIP SUCT DIA12FR W STYL CTRL VENT DISPOSABLE FRAZ

## (undated) DEVICE — ELECTRODE PT RET AD L9FT HI MOIST COND ADH HYDRGEL CORDED

## (undated) DEVICE — PADDING UNDERCAST W4INXL4YD 100% COT CRIMPED FINISH WBRL II

## (undated) DEVICE — GOWN SIRUS NONREIN XL W/TWL: Brand: MEDLINE INDUSTRIES, INC.

## (undated) DEVICE — BIT DRL L140MM DIA2MM QUIK CPL 3 FLUT CALIB DEPTH MRK W/O

## (undated) DEVICE — CHLORAPREP 26ML ORANGE

## (undated) DEVICE — SPLINT ORTH W4XL30IN LAYERED FBRGLS FOAM PD BRTH BK MOLD

## (undated) DEVICE — ESMARK: Brand: DEROYAL

## (undated) DEVICE — SUTURE VCRL SZ 2-0 L18IN ABSRB VLT L26MM SH 1/2 CIR J775D

## (undated) DEVICE — ABDOMINAL PAD: Brand: DERMACEA

## (undated) DEVICE — CORD ES L12FT BPLR FRCP